# Patient Record
Sex: MALE | Race: WHITE
[De-identification: names, ages, dates, MRNs, and addresses within clinical notes are randomized per-mention and may not be internally consistent; named-entity substitution may affect disease eponyms.]

---

## 2018-10-05 ENCOUNTER — HOSPITAL ENCOUNTER (OUTPATIENT)
Dept: HOSPITAL 74 - FASU | Age: 48
Discharge: HOME | End: 2018-10-05
Attending: ORTHOPAEDIC SURGERY
Payer: COMMERCIAL

## 2018-10-05 VITALS — SYSTOLIC BLOOD PRESSURE: 150 MMHG | DIASTOLIC BLOOD PRESSURE: 90 MMHG | HEART RATE: 75 BPM

## 2018-10-05 VITALS — BODY MASS INDEX: 25.4 KG/M2

## 2018-10-05 VITALS — TEMPERATURE: 97.9 F

## 2018-10-05 DIAGNOSIS — M75.41: Primary | ICD-10-CM

## 2018-10-05 DIAGNOSIS — M75.111: ICD-10-CM

## 2018-10-05 PROCEDURE — 0LQ10ZZ REPAIR RIGHT SHOULDER TENDON, OPEN APPROACH: ICD-10-PCS | Performed by: ORTHOPAEDIC SURGERY

## 2018-10-05 PROCEDURE — 0MN10ZZ RELEASE RIGHT SHOULDER BURSA AND LIGAMENT, OPEN APPROACH: ICD-10-PCS | Performed by: ORTHOPAEDIC SURGERY

## 2018-10-05 PROCEDURE — 0PB90ZZ EXCISION OF RIGHT CLAVICLE, OPEN APPROACH: ICD-10-PCS | Performed by: ORTHOPAEDIC SURGERY

## 2018-10-05 NOTE — OP
Operative Note





- Note:


Operative Date: 10/05/18


Pre-Operative Diagnosis: Right shoulder impingement syndrome


Operation: Right shoulder open:  1. Neer decompression.  2. Raul procedure


Post-Operative Diagnosis: Same as Pre-op


Surgeon: Paul Diego


Assistant: Lg Diego


Anesthesiologist/CRNA: Amara Mireles


Anesthesia: Local (Interscalene block)


Estimated Blood Loss (mls): 25


Fluid Volume Replaced (mls): 300


Operative Report Dictated: Yes

## 2018-10-06 NOTE — OP
DATE OF OPERATION:  10/05/2018

 

SURGEON:  Paul Diego MD

 

ASSISTANT:  Lg Diego MD  

 

ANESTHESIA:  Conscious sedation with scalene block.  

 

PREOPERATIVE DIAGNOSIS:  Right shoulder rotator cuff impingement syndrome with

rotator cuff tear.  

 

POSTOPERATIVE DIAGNOSIS:  Right shoulder rotator cuff impingement syndrome with

rotator cuff tear.  

 

OPERATION PERFORMED:  

1.  Raul excision arthroplasty, clavicle.

2.  Acromioplasty with transection of coracoacromial ligament.

3.  Small repair of rotator cuff.  

 

POSITION:  Beach-chair Weiss position.  

 

ANTIBIOTICS GIVEN:  Kefzol 2 g preoperatively. 

 

OPERATION IN DETAIL:  The patient in the supine position, almost 60 degrees, with

head up, the right upper extremity was prepped with Betadine scrub solution, wiped

with alcohol, DuraPrep applied, and a free drape applied.  An incision was made from

the tip of the acromion to the tip coracoid process, and the lines of Moses.  The

dissection was taken through the subcutaneous tissue to the muscle to the clavicle. 

At the clavicle, the superior surface was identified by removing the soft tissue with

Bovie off the actual bone bed.  The AC joint was noted, and a Hohmann was placed

distally in the clavicle to keep the distal end of the clavicle out of harms way.  A

1-cm excision arthroplasty performed with an oscillating saw, beveled inwards

accordingly.  Once this had been performed, the deltoid and plane between the deltoid

and the CA ligament was identified.  This was gently lifted with the [facility]

retractor.  The CA ligament was transected with a 15 blade knife, and using

Metzenbaum scissors, this transection was extended proximally as well as distally

into the proximal area of the humeral head and neck region.  This freed the entire

rotator cuff.  With finger dissection, pulling the arm distally that is in axillary

line force, the subacromial space was entered, adhesions broken down.  A blunt

Hohmann was placed in the undersurface of the acromion leaving the humeral head

downwards, and a beveled excision in the plane parallel to the acromion was performed

to raise the root of the shoulder joint and remove the anterior impinging beak of

acromion noted.  Once this had been performed, the tissues were lavaged, the rotator

cuff was carefully inspected by placing the arm in full range of movement. This was

all under direct vision.  A small tear was noted and repaired with No. 1 Vicryl

suture.  Closure muscle capsule 1 Vicryl, subcutaneous 1 and 2-0 Vicryl, skin with

3-0 Monocryl with Steri-Strips.  Operation went well.  No complications.  Sling was

applied.

 

 

MD WARNER Quintero/[id number]

DD: 10/05/2018 15:00

DT: 10/06/2018 07:10

Job #:  [id number]

## 2018-10-12 NOTE — PATH
Surgical Pathology Report



Patient Name:  VINOD HAJI

Accession #:  [id number]

Med. Rec. #:  [id number]                                                        

   /Age/Gender:  1970 (Age: 47) / M

Account:  [id number]                                                          

             Location: [facility] 

Taken:  10/5/2018

Received:  10/5/2018

Reported:  10/10/2018

Physicians:  Paul Diego M.D.

  



Specimen(s) Received

 DISTAL CLAVICLE RIGHT SHOULDER 





Clinical History

Impingement syndrome right shoulder







Final Diagnosis

SHOULDER, DISTAL CLAVICLE, RIGHT, NEER DECOMPRESSION AND RACHEL PROCEDURE:

BONE WITH DEGENERATIVE CHANGES.

BONE MARROW WITH TRILINEAGE HEMATOPOIESIS.





***Electronically Signed***

Bibi Snow M.D.





Gross Description

Received in formalin labeled "distal clavicle right shoulder," is a 2.5 x 1.9 x

1.2 cm portion of bone. Also received within the same container is a 2.0 x 2.0 x

0.4 cm aggregate of tan-yellow portions of soft tissue. Representative sections

are submitted in one cassette, following decalcification. 

/10/9/2018



[facility]/10/9/2018